# Patient Record
Sex: FEMALE | URBAN - METROPOLITAN AREA
[De-identification: names, ages, dates, MRNs, and addresses within clinical notes are randomized per-mention and may not be internally consistent; named-entity substitution may affect disease eponyms.]

---

## 2022-12-14 ENCOUNTER — APPOINTMENT (RX ONLY)
Dept: URBAN - METROPOLITAN AREA CLINIC 151 | Facility: CLINIC | Age: 27
Setting detail: DERMATOLOGY
End: 2022-12-14

## 2022-12-14 DIAGNOSIS — L259 CONTACT DERMATITIS AND OTHER ECZEMA, UNSPECIFIED CAUSE: ICD-10-CM

## 2022-12-14 PROBLEM — L30.8 OTHER SPECIFIED DERMATITIS: Status: ACTIVE | Noted: 2022-12-14

## 2022-12-14 PROCEDURE — ? PRESCRIPTION

## 2022-12-14 PROCEDURE — 99214 OFFICE O/P EST MOD 30 MIN: CPT

## 2022-12-14 PROCEDURE — ? COUNSELING

## 2022-12-14 PROCEDURE — ? DIAGNOSIS COMMENT

## 2022-12-14 RX ORDER — TACROLIMUS 1 MG/G
OINTMENT TOPICAL
Qty: 60 | Refills: 1 | Status: CANCELLED | COMMUNITY
Start: 2022-12-14

## 2022-12-14 RX ADMIN — TACROLIMUS: 1 OINTMENT TOPICAL at 00:00

## 2022-12-14 NOTE — PROCEDURE: MIPS QUALITY
Quality 47: Advance Care Plan: Advance care planning not documented, reason not otherwise specified.
Quality 110: Preventive Care And Screening: Influenza Immunization: Influenza Immunization Administered during Influenza season
Quality 226: Preventive Care And Screening: Tobacco Use: Screening And Cessation Intervention: Patient screened for tobacco use and is an ex/non-smoker
Detail Level: Detailed
Quality 431: Preventive Care And Screening: Unhealthy Alcohol Use - Screening: Patient not identified as an unhealthy alcohol user when screened for unhealthy alcohol use using a systematic screening method
Quality 130: Documentation Of Current Medications In The Medical Record: Current Medications Documented

## 2022-12-14 NOTE — HPI: OTHER
Condition:: vulvar eczema
Please Describe Your Condition:: is an established patient who is being seen for a chief complaint of vulvar eczema. Ongoing since pt was 15 years old. Has been using topical clobetasol since then for treatment. Has not been diagnosed with LSA, no prior biopsies. Occasionally feels pruritus and pain, especially after intercourse. No additional areas involved including buttocks or other areas on skin.

## 2022-12-14 NOTE — PROCEDURE: DIAGNOSIS COMMENT
Comment: Chronic, since age 15. Erythema on exam today, no atrophy or sclerosis noted. Discussed ddx includes vulvar dermatitis vs LSA and biopsy would help with confirmation, deferred for now. Of note, pt has history of Crohn’s disease, considered in differential, no knife like slits or ulcerations noted. Minimal improvement on topical clobetasol. Will d/c for now and start topical tacrolimus BID (discussed side effect of burning sensation, recommend placing in refrigerator). Discussed importance of regular OBGYN exams as well to monitor for changes (discussed association of LSA with SCC). Of note, new prescription for tacrolimus sent with correct sig.
Detail Level: Detailed
Render Risk Assessment In Note?: no

## 2022-12-15 ENCOUNTER — RX ONLY (OUTPATIENT)
Age: 27
Setting detail: RX ONLY
End: 2022-12-15

## 2022-12-15 RX ORDER — TACROLIMUS 1 MG/G
OINTMENT TOPICAL
Qty: 30 | Refills: 1 | Status: ERX